# Patient Record
(demographics unavailable — no encounter records)

---

## 2025-05-13 NOTE — HISTORY OF PRESENT ILLNESS
[de-identified] : Referred by: Adamaris Mathew   HPI: 73 year old female here for her f/u medical weight loss appointment. PMHx is significant for class 3 obesity s/p sleeve gastrectomy in 2015 now with weight regain and class 1 obesity, dyslipidemia, hx of thyroid cancer, insomnia, grief rxn, hypothyroidism and right knee OA s/p R knee replacement.    Weight History: Highest/Pre-op Weight: 233 lbs Alfred Weight: 145 -150 lbs Weight at [facility]: 184 lbs/33.12 kg/m2 (Aug 2024) Weight trajectory: 184 --> --> 178 --> 181 Current weight: 181 lbs  Since we last met, she reports that she has started bupropion - now on 150 mg x 2 (300 mg). Is taking naltrexone in the AM. Continues on topiramate 75 mg.  Continues to struggle with eating at night.  Reports that she struggled more last month with adhering to lifestyle modifications due to numerous birthday celebrations.  Continues to go to grief meetings 2x/month at the [facility].   Continues to meet with the RD (Yolanda) - has been working to increase her intake of protein - has been adding protein powder to her yogurt.   Has not yet been able to increase her physical activity as hoped.    Review of Systems is also notable for: Rare GERD Denies knee pain Firm BMs (takes benefiber) Denies HAs Denies numbness and tingling Denies SOB/CP   Previous Weight Loss Efforts: Has struggled with her weight for years. Underwent a sleeve gastrectomy in 2015. Pre-op weight was 233 lbs. Alfred weight was 145-150 lbs.  Comfortable weight was 160 lbs. Was able to maintain weight for years until had increased knee pain --> dx of right knee OA for which she underwent right knee replacement in . PCP started her on the generic components of Contrave - bupropion and naltrexone - has helped, but continues to struggle with night eating. Since starting on bupropion - BP has trended up with SBP in the 140s.  Started by her PCP in 2024.   Continues to take her isaiah supplements   Previous use of AOMs: Generic components of Contrave (bupropion and naltrexone) as above Medications that may have contributed to weight gain: None   Pertinent [facility] (May 2024): TChol 221H  TGs 60  H FBG 72 Cr 0.54 L  [facility] Aug 2024:  Vitamin B12 1328 H  Vitamin B1 219 H Vit D 83   Eating behaviors: Sits and eats at night  Bored eating Emotional eating Has various food sensitivities Still has restriction  Craves sweet and crunch Feels as if she "always needs something in her mouth" Salt makes her sick    Movement: Used to walk the mall pre-pandemic  SocHx:  in 2023 (  of CHF) Lives with daughter and granddaughter social alcohol  former smoker  Sleep: Goes to bed between 1-2 AM Gets 3-4 hrs of sleep Takes a sleeping pill Up by 5 AM- Has coffee Then eats at 8   Mental Health: Lost  in Nov Continues to grieve Goes to grief group 2x/month   Reproductive/Preventive Screenings: Colonoscopy UTD Mammo UTD Pap UTD

## 2025-05-22 NOTE — HISTORY OF PRESENT ILLNESS
[FreeTextEntry1] : 72-year-old female PMH: HLD, mild MR, hypothyroidism . The patient has had gastric sleeve in the past and is seeing bariatric team and on Topamx for weight loss . Her nuclear stress test was negative for ischemia in 6-2024

## 2025-05-22 NOTE — ASSESSMENT
[FreeTextEntry1] : 72-year-old female PMH: HLD, mild MR, hypothyroidism . The patient has not had chest pain or SOB Nuclear stress test was negative for ischemia  in June . LDL is not at goal.  LDL is >100 . She has been followed weight loss center . Hx of gastric bypass . The patient has been feeling well since last visit .   Plan: Blood work  Follow up in 6 months increase Rosuvastatin to daily  If muscle pain will decrease again and add Zetia vs PCSK-9 inhibitor

## 2025-05-22 NOTE — PHYSICAL EXAM
[General Appearance - Well Developed] : well developed [Normal Appearance] : normal appearance [Well Groomed] : well groomed [General Appearance - Well Nourished] : well nourished [No Deformities] : no deformities [General Appearance - In No Acute Distress] : no acute distress [Normal Conjunctiva] : the conjunctiva exhibited no abnormalities [Eyelids - No Xanthelasma] : the eyelids demonstrated no xanthelasmas [Normal Oral Mucosa] : normal oral mucosa [No Oral Pallor] : no oral pallor [No Oral Cyanosis] : no oral cyanosis [Respiration, Rhythm And Depth] : normal respiratory rhythm and effort [Exaggerated Use Of Accessory Muscles For Inspiration] : no accessory muscle use [Auscultation Breath Sounds / Voice Sounds] : lungs were clear to auscultation bilaterally [Abdomen Soft] : soft [Abdomen Tenderness] : non-tender [Abdomen Mass (___ Cm)] : no abdominal mass palpated [Abnormal Walk] : normal gait [Gait - Sufficient For Exercise Testing] : the gait was sufficient for exercise testing [Nail Clubbing] : no clubbing of the fingernails [Cyanosis, Localized] : no localized cyanosis [Petechial Hemorrhages (___cm)] : no petechial hemorrhages [Skin Color & Pigmentation] : normal skin color and pigmentation [] : no rash [No Venous Stasis] : no venous stasis [Skin Lesions] : no skin lesions [No Skin Ulcers] : no skin ulcer [No Xanthoma] : no  xanthoma was observed [Oriented To Time, Place, And Person] : oriented to person, place, and time [Affect] : the affect was normal [Mood] : the mood was normal [No Anxiety] : not feeling anxious [Normal Rate] : normal [Rhythm Regular] : regular [No Murmur] : no murmurs heard [1+] : left 1+ [No Pitting Edema] : no pitting edema present [Rt] : varicose veins of the right leg noted [Lt] : varicose veins of the left leg noted [FreeTextEntry1] : No JVD

## 2025-05-22 NOTE — CARDIOLOGY SUMMARY
[___] : [unfilled] [de-identified] : 11-5-2021 NSR Low voltage in precordial leads. 5- NSR normal ECG .   [de-identified] : [facility] 2 weeks 6/2024: PAV <1%, PVC <1%  [de-identified] : 06- Lexiscan No evidence of ischemia, LVE greater than 55%  12-3-2021 Lexiscan stress test No ischemia   [de-identified] :  EF 68% mild MR mild to mod TR  4- Normal LV systolic functio mild MR mild to mod TR RVSP was 27 mmhg

## 2025-05-22 NOTE — REASON FOR VISIT
[CV Risk Factors and Non-Cardiac Disease] : CV risk factors and non-cardiac disease [Follow-Up - Clinic] : a clinic follow-up of [Hyperlipidemia] : hyperlipidemia [Hypertension] : hypertension [Mitral Regurgitation] : mitral regurgitation [FreeTextEntry3] : Dr. Finnegan